# Patient Record
Sex: MALE | Race: BLACK OR AFRICAN AMERICAN | NOT HISPANIC OR LATINO | Employment: FULL TIME | URBAN - METROPOLITAN AREA
[De-identification: names, ages, dates, MRNs, and addresses within clinical notes are randomized per-mention and may not be internally consistent; named-entity substitution may affect disease eponyms.]

---

## 2020-04-03 PROBLEM — J30.9 ALLERGIC RHINITIS: Status: ACTIVE | Noted: 2020-04-03

## 2020-04-03 PROBLEM — I10 HYPERTENSION: Status: ACTIVE | Noted: 2020-04-03

## 2022-09-06 DIAGNOSIS — Z76.89 ESTABLISHING CARE WITH NEW DOCTOR, ENCOUNTER FOR: Primary | ICD-10-CM

## 2022-09-07 ENCOUNTER — OFFICE VISIT (OUTPATIENT)
Dept: CARDIOLOGY | Facility: CLINIC | Age: 42
End: 2022-09-07
Payer: COMMERCIAL

## 2022-09-07 VITALS
WEIGHT: 315 LBS | OXYGEN SATURATION: 97 % | BODY MASS INDEX: 40.43 KG/M2 | HEART RATE: 95 BPM | DIASTOLIC BLOOD PRESSURE: 98 MMHG | SYSTOLIC BLOOD PRESSURE: 148 MMHG | HEIGHT: 74 IN

## 2022-09-07 DIAGNOSIS — R94.31 ABNORMAL ECG: ICD-10-CM

## 2022-09-07 DIAGNOSIS — R29.818 SUSPECTED SLEEP APNEA: Primary | ICD-10-CM

## 2022-09-07 DIAGNOSIS — E66.01 MORBID OBESITY: ICD-10-CM

## 2022-09-07 DIAGNOSIS — I10 ESSENTIAL HYPERTENSION, BENIGN: ICD-10-CM

## 2022-09-07 PROCEDURE — 3080F PR MOST RECENT DIASTOLIC BLOOD PRESSURE >= 90 MM HG: ICD-10-PCS | Mod: CPTII,S$GLB,, | Performed by: INTERNAL MEDICINE

## 2022-09-07 PROCEDURE — 4010F ACE/ARB THERAPY RXD/TAKEN: CPT | Mod: CPTII,S$GLB,, | Performed by: INTERNAL MEDICINE

## 2022-09-07 PROCEDURE — 3080F DIAST BP >= 90 MM HG: CPT | Mod: CPTII,S$GLB,, | Performed by: INTERNAL MEDICINE

## 2022-09-07 PROCEDURE — 3008F BODY MASS INDEX DOCD: CPT | Mod: CPTII,S$GLB,, | Performed by: INTERNAL MEDICINE

## 2022-09-07 PROCEDURE — 4010F PR ACE/ARB THEARPY RXD/TAKEN: ICD-10-PCS | Mod: CPTII,S$GLB,, | Performed by: INTERNAL MEDICINE

## 2022-09-07 PROCEDURE — 99999 PR PBB SHADOW E&M-EST. PATIENT-LVL III: CPT | Mod: PBBFAC,,, | Performed by: INTERNAL MEDICINE

## 2022-09-07 PROCEDURE — 3077F PR MOST RECENT SYSTOLIC BLOOD PRESSURE >= 140 MM HG: ICD-10-PCS | Mod: CPTII,S$GLB,, | Performed by: INTERNAL MEDICINE

## 2022-09-07 PROCEDURE — 99204 PR OFFICE/OUTPT VISIT, NEW, LEVL IV, 45-59 MIN: ICD-10-PCS | Mod: S$GLB,,, | Performed by: INTERNAL MEDICINE

## 2022-09-07 PROCEDURE — 1159F PR MEDICATION LIST DOCUMENTED IN MEDICAL RECORD: ICD-10-PCS | Mod: CPTII,S$GLB,, | Performed by: INTERNAL MEDICINE

## 2022-09-07 PROCEDURE — 3008F PR BODY MASS INDEX (BMI) DOCUMENTED: ICD-10-PCS | Mod: CPTII,S$GLB,, | Performed by: INTERNAL MEDICINE

## 2022-09-07 PROCEDURE — 3077F SYST BP >= 140 MM HG: CPT | Mod: CPTII,S$GLB,, | Performed by: INTERNAL MEDICINE

## 2022-09-07 PROCEDURE — 99999 PR PBB SHADOW E&M-EST. PATIENT-LVL III: ICD-10-PCS | Mod: PBBFAC,,, | Performed by: INTERNAL MEDICINE

## 2022-09-07 PROCEDURE — 99204 OFFICE O/P NEW MOD 45 MIN: CPT | Mod: S$GLB,,, | Performed by: INTERNAL MEDICINE

## 2022-09-07 PROCEDURE — 1159F MED LIST DOCD IN RCRD: CPT | Mod: CPTII,S$GLB,, | Performed by: INTERNAL MEDICINE

## 2022-09-07 RX ORDER — CARVEDILOL 6.25 MG/1
6.25 TABLET ORAL 2 TIMES DAILY WITH MEALS
Qty: 60 TABLET | Refills: 11 | Status: SHIPPED | OUTPATIENT
Start: 2022-09-07 | End: 2023-09-07

## 2022-09-07 NOTE — PROGRESS NOTES
Subjective:   Patient ID:  Atul Zhang is a 42 y.o. male who presents for evaluation of No chief complaint on file.      HPI  43 yo male never smoker, famitly h/o CAD, HTN , CKD possibly secondary to use of NSAIDs  Comes in for evaluation of HTN , also sees nephrology on hctz-amlodipine-olmesartan   No chest pain or markham  No headaches, syncope or presyncope   Seen Dr Montes De Oca in 2020 with initial plan for echo and ETT but did not follow       STOP - BANG Questionnaire:     1. Snoring : Do you snore loudly ?    Yes    2. Tired : Do you often feel tired, fatigued, or sleepy during daytime? Yes    3. Observed: Has anyone observed you stop breathing during your sleep?   Yes     4. Blood pressure : Do you have or are you being treated for high blood pressure?   Yes    5. BMI :BMI more than 35 kg/m2?   Yes    6. Age : Age over 50 yr old?   No    7. Neck circumference:   For male, is your shirt collar 17 inches / 43cm or larger?  For female, is your shirt collar 16 inches / 41cm or larger?    Yes    8. Gender: Gender male?   Yes    STOP BANG SCORE 7    High risk of KATHLEEN: Yes 5 - 8  Intermediate risk of KATHLEEN: Yes 3 - 4  Low risk of KATHLEEN: Yes 0 - 2      References:   STOP Questionnaire   A Tool to Screen Patients for Obstructive Sleep Apnea: SANA Serna.C.P.C., NICOLE Valdes.B.B.S., Dom Cuevas M.D.,Sonia Boyd, Ph.D., Marlena Bellamy M.B.B.S.,_ Antione Nieves M.Sc.,_ Camille Sanchez M.D., Benigno Foster F.R.C.P.C.; Anesthesiology 2008; 108:[id number] Copyright © 2008, the American Society of Anesthesiologists, Inc. [employer]    Past Medical History:   Diagnosis Date    Allergic rhinitis     Arthritis     Hypertension     Hypertension        History reviewed. No pertinent surgical history.    Social History     Tobacco Use    Smoking status: Never    Smokeless tobacco: Never   Substance Use Topics    Alcohol use: Yes     Comment: OCCASIONAL DRINK    Drug use: Never        Family History   Problem Relation Age of Onset    Diabetes Mother     Heart disease Father        Review of Systems   Constitutional: Negative for fever and malaise/fatigue.   HENT:  Negative for sore throat.    Eyes:  Negative for blurred vision.   Cardiovascular:  Negative for chest pain, claudication, cyanosis, dyspnea on exertion, irregular heartbeat, leg swelling, near-syncope, orthopnea, palpitations, paroxysmal nocturnal dyspnea and syncope.   Respiratory:  Positive for snoring. Negative for cough and hemoptysis.    Hematologic/Lymphatic: Negative for bleeding problem.   Skin:  Negative for rash.   Musculoskeletal:  Negative for falls.   Gastrointestinal:  Negative for abdominal pain.   Genitourinary: Negative.    Neurological: Negative.    Psychiatric/Behavioral:  Negative for altered mental status and substance abuse.      Current Outpatient Medications on File Prior to Visit   Medication Sig    allopurinoL (ZYLOPRIM) 300 MG tablet Take 1 tablet by mouth once daily    cholecalciferol, vitamin D3, 125 mcg (5,000 unit) capsule Take 1 capsule (5,000 Units total) by mouth once daily.    olmesartan-amLODIPin-hcthiazid 40-5-25 mg Tab Take 1 tablet by mouth once daily.    pravastatin (PRAVACHOL) 20 MG tablet Take 1 tablet (20 mg total) by mouth once daily.     No current facility-administered medications on file prior to visit.       Objective:   Objective:  Wt Readings from Last 3 Encounters:   09/07/22 (!) 143 kg (315 lb 4.1 oz)   08/17/22 (!) 137.6 kg (303 lb 6.4 oz)   08/05/21 134.5 kg (296 lb 9.6 oz)     Temp Readings from Last 3 Encounters:   08/17/22 98.9 °F (37.2 °C)   08/05/21 98.2 °F (36.8 °C) (Skin)   09/03/20 98.2 °F (36.8 °C) (Oral)     BP Readings from Last 3 Encounters:   09/07/22 (!) 148/98   08/17/22 (!) 201/140   08/05/21 (!) 146/96     Pulse Readings from Last 3 Encounters:   09/07/22 95   08/17/22 84   08/05/21 98       Physical Exam  Vitals reviewed.   Constitutional:       Appearance:  He is well-developed.   HENT:      Head: Normocephalic and atraumatic.   Eyes:      General: No scleral icterus.     Conjunctiva/sclera: Conjunctivae normal.   Cardiovascular:      Rate and Rhythm: Normal rate and regular rhythm.      Pulses: Intact distal pulses.      Heart sounds: Normal heart sounds. No murmur heard.  Pulmonary:      Effort: No respiratory distress.      Breath sounds: No wheezing or rales.   Chest:      Chest wall: No tenderness.   Abdominal:      General: Bowel sounds are normal. There is no distension.      Palpations: Abdomen is soft.      Tenderness: There is no guarding.   Musculoskeletal:         General: Normal range of motion.      Cervical back: Normal range of motion and neck supple.   Skin:     General: Skin is warm.   Neurological:      Mental Status: He is alert and oriented to person, place, and time.       Lab Results   Component Value Date    CHOL 213 (H) 08/17/2022    CHOL 212 (H) 08/05/2021    CHOL 195 09/03/2020     Lab Results   Component Value Date    HDL 51 08/17/2022    HDL 41 08/05/2021    HDL 45 09/03/2020     Lab Results   Component Value Date    LDLCALC 142 (H) 08/17/2022    LDLCALC 155 (H) 08/05/2021    LDLCALC 125 (H) 09/03/2020     Lab Results   Component Value Date    TRIG 109 08/17/2022    TRIG 89 08/05/2021    TRIG 138 09/03/2020     No results found for: CHOLHDL    Chemistry        Component Value Date/Time     08/17/2022 1749    K 8.8 (>) 08/17/2022 1749     08/17/2022 1749    CO2 18 (L) 08/17/2022 1749    BUN 18 08/17/2022 1749    CREATININE 1.72 (H) 08/17/2022 1749    GLU 85 08/17/2022 1749        Component Value Date/Time    CALCIUM 9.8 08/17/2022 1749    ALKPHOS 61 04/03/2020 1057    AST 36 08/17/2022 1749    ALT 23 08/17/2022 1749    BILITOT 0.4 08/17/2022 1749    EGFRNONAA 49 (L) 08/05/2021 1600          Lab Results   Component Value Date    TSH 3.080 08/17/2022     No results found for: INR, PROTIME  Lab Results   Component Value Date    WBC  7.8 08/17/2022    HGB 14.8 08/17/2022    HCT 42.4 08/17/2022    MCV 89 08/17/2022     08/17/2022     BNP  @LABRCNTIP(BNP,BNPTRIAGEBLO)@  CrCl cannot be calculated (Patient's most recent lab result is older than the maximum 7 days allowed.).     Imaging:  ======  No results found for this or any previous visit.    No results found for this or any previous visit.    No results found for this or any previous visit.    No results found for this or any previous visit.    No results found for this or any previous visit.    No valid procedures specified.    Diagnostic Results:  ECG: Reviewed    Normal sinus rhythm  Inferior infarct ,age undetermined  Anterior infarct ,age undetermined  Abnormal ECG  No previous ECGs available    The 10-year ASCVD risk score (Kendall WELLINGTON, et al., 2019) is: 8.1%    Values used to calculate the score:      Age: 42 years      Sex: Male      Is Non- : Yes      Diabetic: No      Tobacco smoker: No      Systolic Blood Pressure: 148 mmHg      Is BP treated: Yes      HDL Cholesterol: 51 mg/dL      Total Cholesterol: 213 mg/dL    Assessment and Plan:   Suspected sleep apnea  -     Home Sleep Studies; Future    Essential hypertension, benign  -     Ambulatory referral/consult to Cardiology  -     carvediloL (COREG) 6.25 MG tablet; Take 1 tablet (6.25 mg total) by mouth 2 (two) times daily with meals.  Dispense: 60 tablet; Refill: 11  -     Stress Echo Which stress agent will be used? Treadmill Exercise; Color Flow Doppler? No; Future    Abnormal ECG  -     Stress Echo Which stress agent will be used? Treadmill Exercise; Color Flow Doppler? No; Future    Morbid obesity    Stopbang score of 7 check a sleep study  Add coreg to amlodipine-olmesartan-hct   Check BP at home for better titration of meds   US kidneys reviewed  Reviewed nephrology notes   Reviewed all tests and above medical conditions with patient in detail and formulated treatment plan.  Risk factor modification  discussed.   Cardiac low salt diet discussed.  Maintaining healthy weight and weight loss goals were discussed in clinic.    Follow up in 6 months

## 2022-09-09 ENCOUNTER — TELEPHONE (OUTPATIENT)
Dept: PULMONOLOGY | Facility: HOSPITAL | Age: 42
End: 2022-09-09
Payer: COMMERCIAL

## 2022-09-26 ENCOUNTER — PATIENT MESSAGE (OUTPATIENT)
Dept: CARDIOLOGY | Facility: HOSPITAL | Age: 42
End: 2022-09-26
Payer: COMMERCIAL